# Patient Record
Sex: FEMALE | Race: WHITE | NOT HISPANIC OR LATINO | Employment: FULL TIME | URBAN - METROPOLITAN AREA
[De-identification: names, ages, dates, MRNs, and addresses within clinical notes are randomized per-mention and may not be internally consistent; named-entity substitution may affect disease eponyms.]

---

## 2020-03-12 ENCOUNTER — TELEPHONE (OUTPATIENT)
Dept: FAMILY MEDICINE | Facility: CLINIC | Age: 40
End: 2020-03-12

## 2020-03-12 NOTE — TELEPHONE ENCOUNTER
Spoke with patient.  New to the area.  Called to make an appointment but was sent to triage to review symptoms.    Patient states she thinks she has strep throat.  Symptoms (x 5 days):  Low grade fever, stuffy nose, slight headache.  Denies cough and any international/domestic travel and any exposure to person who tested + for Covid-19    No appointments open at [facility] today.  [facility] because she would like to be seen right now.    Juliana Dewey RN

## 2020-06-16 ENCOUNTER — VIRTUAL VISIT (OUTPATIENT)
Dept: FAMILY MEDICINE | Facility: OTHER | Age: 40
End: 2020-06-16

## 2020-06-16 NOTE — PROGRESS NOTES
"Date: 2020 08:25:59  Clinician: Ji Sandoval  Clinician NPI: [id number]  Patient: Rosalba Mcdonald  Patient : 1980  Patient Address: [address] [place] [address] [address], [place] [address]  Patient Phone: [phone]  Visit Protocol: UTI  Patient Summary:  Rosalba is a 39 year old ( : 1980 ) female who initiated a Visit for a presumed bladder infection. When asked the question \"Please sign me up to receive news, health information and promotions from [employer].\", Rosalba responded \"Yes\".   Her symptoms started 4-6 days ago and consist of feeling as if the bladder is never empty, urinary frequency, urgency, and urinary incontinence.   Symptom details   Urine color: The color of her urine is orange.    Denied symptoms include dysuria, foul-smelling urine, nausea, flank pain, abdominal pain, chills, vaginal discharge, vomiting, and vaginal itching. She does not feel feverish.   Over-the-counter medications or home remedies used to relieve the current symptoms as reported by the patient (free text): AZO   Precipitating events  Rosalba denies having a sexually transmitted disease.  Pertinent medical history  Rosalba has had a bladder infection before and has had 2 in the past 12 months. Her most recent bladder infection was not within the last 4 weeks. Her current symptoms are similar to her previous bladder infection symptoms.   She is not sure what antibiotics have been effective in treating her past bladder infections.   Rosalba typically gets a yeast infection when she takes antibiotics. She has used fluconazole (Diflucan) to treat previous yeast infections. 1 dose of fluconazole (Diflucan) has typically been sufficient for symptoms to resolve in the past.   Rosalba has not been prescribed antibiotics to prevent frequent or repeated bladder infections in the past. She has not experienced problems or side effects with any of the common antibiotics used to treat bladder infections.   Rosalba does not have a history of " kidney stones. She has not used a catheter or been a patient in a hospital or nursing home in the past 2 weeks.   Rosalba does not smoke or use smokeless tobacco.   She denies pregnancy and denies breastfeeding. She does not menstruate.     MEDICATIONS: No current medications, ALLERGIES: NKDA  Clinician Response:  Dear Rosalba,    I have sent you a prescription for bladder infection and yeast infection if you end up needing it.&nbsp; In the event your symptoms do not improve then you should be seen in an urgent care.    Diagnosis: Urinary tract infection, site not specified  Diagnosis ICD: N39.0  Prescription: nitrofurantoin monohyd/m-cryst (Macrobid) 100 mg oral capsule 10 capsule, 5 days supply. Take 1 capsule by mouth every 12 hours for 5 days. Refills: 0, Refill as needed: no, Allow substitutions: yes  Prescription: fluconazole (Diflucan) 150 mg oral tablet 1 tablet, 1 days supply. Take 1 tablet by mouth every 24 hours for 1 day. Refills: 0, Refill as needed: no, Allow substitutions: yes  Pharmacy: [id number] - [phone] - [address], [place] [address]  Addendum created: June 20 12:00:44, 2020 created by: Mira Zavala body: She needs to be seen in clinic for a urinalysis and exam.   Thank you!

## 2020-10-18 ENCOUNTER — VIRTUAL VISIT (OUTPATIENT)
Dept: FAMILY MEDICINE | Facility: OTHER | Age: 40
End: 2020-10-18

## 2020-10-18 NOTE — PROGRESS NOTES
"Date: 10/18/2020 11:40:43  Clinician: Petr Goins  Clinician NPI: [id number]  Patient: Rosalba Mcdonald  Patient : 1980  Patient Address: [address] [place] [address] [address], [place] [address]  Patient Phone: [phone]  Visit Protocol: UTI  Patient Summary:  Rosalba is a 40 year old ( : 1980 ) female who initiated a [facility] Visit for a presumed bladder infection. When asked the question \"Please sign me up to receive news, health information and promotions from [facility].\", Rosalba responded \"No\".   Her symptoms started 1-3 days ago and consist of urinary frequency, urgency, urinary incontinence, dysuria, foul-smelling urine, and feeling as if the bladder is never empty.   Symptom details   Urine color: The color of her urine is yellow.    Denied symptoms include vaginal discharge, flank pain, abdominal pain, chills, vomiting, vaginal itching, and nausea. She does not feel feverish.   Over-the-counter medications or home remedies used to relieve the current symptoms as reported by the patient (free text): Azo   Precipitating events  Rosalba denies having a sexually transmitted disease.  Pertinent medical history  Rosalba has had a bladder infection before and has had 1 in the past 12 months. Her most recent bladder infection was not within the last 4 weeks. Her current symptoms are similar to her previous bladder infection symptoms.   She is not sure what antibiotics have been effective in treating her past bladder infections.   Rosalba typically gets a yeast infection when she takes antibiotics. She has used fluconazole (Diflucan) to treat previous yeast infections. 2 doses of fluconazole (Diflucan) has typically been needed for symptoms to resolve in the past.  Rosalba has not been prescribed antibiotics to prevent frequent or repeated bladder infections in the past. She has not experienced problems or side effects with any of the common antibiotics used to treat bladder infections.   Rosalba does not have a history " of kidney stones. She has not used a catheter or been a patient in a hospital or nursing home in the past 2 weeks.   Rosalba smokes or uses smokeless tobacco.   She denies pregnancy and denies breastfeeding. She does not menstruate.     MEDICATIONS: No current medications, ALLERGIES: NKDA  Clinician Response:  Dear Rosalba,  Based on the information you have provided, you likely have an acute urinary tract infection, also called a bladder infection. Bladder infections occur when bacteria from the outside of the body enters the urinary tract. Any part of the urinary system can be infected, but the bladder is the most common.  Medication information  I am prescribing:     Nitrofurantoin monohyd/m-cryst (Macrobid) 100 mg oral capsule. Take 1 capsule by mouth every 12 hours for 5 days. Take this medication with food. There are no refills with this prescription.   The medication I prescribed for your bladder infection is an antibiotic. Continue taking the medication until it is gone even if you feel better.   Yeast infections can be a common side effect of antibiotics. The most common symptom of a yeast infection is itchiness in and around the vagina. Other signs and symptoms include burning, redness, or a thick, white vaginal discharge that looks like cottage cheese and does not have a bad smell.  Self care  Urination helps to flush bacteria from the urinary tract. For this reason, drinking water and urinating often helps relieve some urinary symptoms and can decrease your risk of getting bladder infections in the future.  Other steps you can take to prevent future bladder infections include:     Wipe front to back after using the bathroom    Urinate after sexual intercourse    Avoid using deodorant sprays, douches, or powders in the vaginal area     Becoming tobacco-free is one of the best things you can do to improve your health. For help with quitting tobacco, you can call 3-334-QUIT-NOW (1-609.184.9704) or visit  smokefree.gov.  When to seek care  Please make an appointment to be seen in a clinic or urgent care if any of the following occur:     You develop new symptoms or your symptoms become worse    You have medication side effects that make it difficult to take them as prescribed    Your symptoms do not improve within 1-2 days of starting treatment    You have symptoms of a bladder infection that return shortly after completing treatment     It is possible to have an allergic reaction to an antibiotic even if you have not had one in the past. If you notice a new rash, significant swelling, or difficulty breathing, stop taking this medication immediately and go to a clinic or urgent care.   Diagnosis: Acute uncomplicated bladder infection  Diagnosis ICD: N39.0  Prescription: nitrofurantoin monohyd/m-cryst (Macrobid) 100 mg oral capsule 10 capsule, 5 days supply. Take 1 capsule by mouth every 12 hours for 5 days. Refills: 0, Refill as needed: no, Allow substitutions: yes  Pharmacy: [facility] #[id number] - [phone] - [address],  [place] [address]

## 2020-10-27 ENCOUNTER — VIRTUAL VISIT (OUTPATIENT)
Dept: FAMILY MEDICINE | Facility: OTHER | Age: 40
End: 2020-10-27

## 2020-10-27 NOTE — PROGRESS NOTES
"Date: 10/27/2020 16:55:45  Clinician: Roxanne Myles  Clinician NPI: [id number]  Patient: Rosalba Mcdonald  Patient : 1980  Patient Address: [address] [place] [address] [address], [place] [address]  Patient Phone: [phone]  Visit Protocol: Yeast infection  Patient Summary:  Rosalba is a 40 year old ( : 1980 ) female who initiated a [facility] Visit for a presumed vaginal yeast infection. When asked the question \"Please sign me up to receive news, health information and promotions from [facility].\", Rosalba responded \"Yes\".    Rosalba began noticing vaginal pruritus, vaginal discharge, and vaginal irritation 1-3 days ago.   Symptom details   Vaginal discharge: She has a more than normal amount of white, chunky (like cottage cheese) discharge. The discharge does not have a fishy smell.    She denies having open sores, vaginal burning, abdominal pain, and blisters. She also denies feeling feverish.   She has attempted to treat her symptoms with anti-fungal cream for yeast infections. Anti-fungal medication used to treat symptoms as reported by the patient (free text): vagisil    4 days helped stop itching but did not treat the issue    Precipitating events  Rosalba denies having a sexually transmitted disease.   Pertinent medical history  Rosalba has a history of vaginal yeast infections. She has had one (1) occurrence in the past year and the current symptoms are similar to previous yeast infections. She has used fluconazole (Diflucan) to treat previous yeast infections. 1 dose of fluconazole (Diflucan) has typically been sufficient for symptoms to resolve in the past.    She prefers a pill. She is currently taking or has taken antibiotics in the past 2 weeks.   She does NOT smoke or use smokeless tobacco.   She denies pregnancy and denies breastfeeding. She does not menstruate.      MEDICATIONS: No current medications, ALLERGIES: NKDA  Clinician Response:  Dear Rosalba,  Based on the information you have provided, you likely " have a vaginal yeast infection which is a common infection of the vagina caused by a fungus. Yeast are a type of fungus.  The most common symptom of a yeast infection is itching in and around the vagina. Other signs and symptoms include burning, redness, or a thick, white vaginal discharge that looks like cottage cheese and does not have a bad smell.  Medication information  I am prescribing:     Fluconazole (Diflucan) 150 mg oral tablet. Take 1 tablet by mouth in a single dose. There are no refills with this prescription.   Self care  Steps you can take to prevent symptoms of future vaginal yeast infection:     Avoid irritants such as scented bath products, tampons, pads, or vaginal sprays and powders    Avoid douching    Wear cotton underwear and if you are comfortable doing so, do not wear underwear to bed    Avoid hot tubs and whirlpool spas     When to seek care  Most women notice improvement in their symptoms within 1-2 days after starting treatment with complete clearing in 5-7 days.  Please make an appointment to be seen in a clinic or urgent care if any of the following occur:     Your symptoms have not improved in 3 days or not resolved in 10 days    You develop new symptoms or your symptoms become worse    If you think you may be at risk for an STD      Diagnosis: Candida vulvovaginitis  Diagnosis ICD: B37.3  Prescription: fluconazole (Diflucan) 150 mg oral tablet 1 tablet, 1 days supply. Take 1 tablet by mouth in a single dose. Refills: 0, Refill as needed: no, Allow substitutions: yes  Pharmacy: [facility] #[id number] - [phone] - [address],  [place] [address]

## 2020-11-04 ENCOUNTER — VIRTUAL VISIT (OUTPATIENT)
Dept: FAMILY MEDICINE | Facility: OTHER | Age: 40
End: 2020-11-04
Payer: COMMERCIAL

## 2020-11-04 PROCEDURE — 99421 OL DIG E/M SVC 5-10 MIN: CPT | Performed by: NURSE PRACTITIONER

## 2020-11-04 NOTE — PROGRESS NOTES
"Date: 2020 09:38:08  Clinician: Jessica Bello  Clinician NPI: [id number]  Patient: Rosalba Mcdonald  Patient : 1980  Patient Address: [address] [place] [address] [address], [place] [address]  Patient Phone: [phone]  Visit Protocol: General skin conditions  Patient Summary:  Rosalba is a 40 year old ( : 1980 ) female who initiated a [facility] Visit for evaluation of an unspecified skin condition. When asked the question \"Please sign me up to receive news, health information and promotions from [facility].\", Rosalba responded \"No\".    Images of her skin condition were uploaded.  Her symptoms started 1-2 weeks ago and affect the left side of her body. The skin condition is located on her chest. The skin condition is red and yellow in color.   The affected area has scabs and sores. It feels itchy and burning. The symptoms do not interfere with her sleep.   Symptom details   Redness: The redness has not rapidly increased in size.   The skin condition has not changed since the symptoms started.   Denied symptoms include blisters, drainage, dry/flaky skin, hives, crusts, tender to touch, pain, warm to touch, pimples, and numbness. Rosalba does not feel feverish. She does not have a rash in the shape of a bull's-eye.   Treatments or home remedies used to relieve the symptoms as reported by the patient (free text): not wearing a bra- seems to get worse when my breasts are close together causing sweat, when it is dry seems fine- also been using Lotramine to help and it does but never heals completely.   Precipitating events  Just before the symptoms started, Rosalba came in contact with new medications.    Rosalba has not been in close contact with anyone that has similar symptoms. She also did not spend time in a wooded area, swim, travel, or spend excess time in the sun just before her symptoms started. Rosalba did not get bitten or stung by an insect.   Pertinent medical history  Rosalba has experienced this skin condition " before. Her current skin condition comes and goes. The last time she experienced this skin condition was 4-6 months ago.   Rosalba has had chickenpox, but has not had shingles in the past.    Rosalba does not have a history of atopia. Ongoing medical conditions were denied.   Rosalba does not need a return to work/school note.   Weight: 240 lbs   Rosalba smokes or uses smokeless tobacco.   She denies pregnancy and denies breastfeeding. She does not menstruate.   Weight: 240 lbs    MEDICATIONS: No current medications, ALLERGIES: NKDA  Clinician Response:  Dear Rosalba,    This does not look like a typical yeast rash, but it is in a typical location for yeast. Please try this prescription. Discontinue the Lotrimin cream.&nbsp; If this does not improve symptoms you should be seen for an in person evaluation.    Diagnosis: Candidiasis of skin and nail  Diagnosis ICD: B37.2  Prescription: nystatin (Nyamyc) 100,000 unit/gram topical powder 1 15 gram squeeze bottle, 7 days supply. Apply 1 powder to the affected area(s) topically 2 times per day for 7 days. Refills: 0, Refill as needed: no, Allow substitutions: yes  Pharmacy: [facility] #[id number] - [phone] - [address],  [place] [address]

## 2021-09-27 ENCOUNTER — HOSPITAL ENCOUNTER (EMERGENCY)
Facility: CLINIC | Age: 41
Discharge: HOME | End: 2021-09-27
Attending: EMERGENCY MEDICINE | Admitting: EMERGENCY MEDICINE
Payer: COMMERCIAL

## 2021-09-27 ENCOUNTER — APPOINTMENT (OUTPATIENT)
Dept: RADIOLOGY | Facility: CLINIC | Age: 41
End: 2021-09-27
Attending: EMERGENCY MEDICINE
Payer: COMMERCIAL

## 2021-09-27 VITALS
WEIGHT: 240 LBS | SYSTOLIC BLOOD PRESSURE: 135 MMHG | RESPIRATION RATE: 26 BRPM | HEIGHT: 71 IN | HEART RATE: 78 BPM | OXYGEN SATURATION: 96 % | BODY MASS INDEX: 33.6 KG/M2 | DIASTOLIC BLOOD PRESSURE: 68 MMHG | TEMPERATURE: 97.7 F

## 2021-09-27 DIAGNOSIS — U07.1 2019 NOVEL CORONAVIRUS DISEASE (COVID-19): ICD-10-CM

## 2021-09-27 LAB
ALBUMIN SERPL-MCNC: 3.6 G/DL (ref 3.5–5)
ALBUMIN UR-MCNC: NEGATIVE MG/DL
ALP SERPL-CCNC: 110 U/L (ref 45–120)
ALT SERPL W P-5'-P-CCNC: 26 U/L (ref 0–45)
ANION GAP SERPL CALCULATED.3IONS-SCNC: 12 MMOL/L (ref 5–18)
APPEARANCE UR: CLEAR
AST SERPL W P-5'-P-CCNC: 24 U/L (ref 0–40)
BASOPHILS # BLD AUTO: 0 10E3/UL (ref 0–0.2)
BASOPHILS NFR BLD AUTO: 0 %
BILIRUB SERPL-MCNC: 0.1 MG/DL (ref 0–1)
BILIRUB UR QL STRIP: NEGATIVE
BUN SERPL-MCNC: 10 MG/DL (ref 8–22)
C REACTIVE PROTEIN LHE: 0.4 MG/DL (ref 0–0.8)
CALCIUM SERPL-MCNC: 8.8 MG/DL (ref 8.5–10.5)
CHLORIDE BLD-SCNC: 110 MMOL/L (ref 98–107)
CO2 SERPL-SCNC: 16 MMOL/L (ref 22–31)
COLOR UR AUTO: COLORLESS
CREAT SERPL-MCNC: 0.76 MG/DL (ref 0.6–1.1)
D DIMER PPP FEU-MCNC: 0.47 UG/ML FEU (ref 0–0.5)
EOSINOPHIL # BLD AUTO: 0.2 10E3/UL (ref 0–0.7)
EOSINOPHIL NFR BLD AUTO: 3 %
ERYTHROCYTE [DISTWIDTH] IN BLOOD BY AUTOMATED COUNT: 12.2 % (ref 10–15)
FERRITIN SERPL-MCNC: 218 NG/ML (ref 10–130)
GFR SERPL CREATININE-BSD FRML MDRD: >90 ML/MIN/1.73M2
GLUCOSE BLD-MCNC: 96 MG/DL (ref 70–125)
GLUCOSE UR STRIP-MCNC: NEGATIVE MG/DL
HCT VFR BLD AUTO: 42.9 % (ref 35–47)
HGB BLD-MCNC: 14.8 G/DL (ref 11.7–15.7)
HGB UR QL STRIP: NEGATIVE
IMM GRANULOCYTES # BLD: 0 10E3/UL
IMM GRANULOCYTES NFR BLD: 0 %
KETONES UR STRIP-MCNC: NEGATIVE MG/DL
LACTATE SERPL-SCNC: 0.9 MMOL/L (ref 0.7–2)
LDH SERPL L TO P-CCNC: 251 U/L (ref 125–220)
LEUKOCYTE ESTERASE UR QL STRIP: NEGATIVE
LIPASE SERPL-CCNC: 36 U/L (ref 0–52)
LYMPHOCYTES # BLD AUTO: 4.2 10E3/UL (ref 0.8–5.3)
LYMPHOCYTES NFR BLD AUTO: 59 %
MCH RBC QN AUTO: 32 PG (ref 26.5–33)
MCHC RBC AUTO-ENTMCNC: 34.5 G/DL (ref 31.5–36.5)
MCV RBC AUTO: 93 FL (ref 78–100)
MONOCYTES # BLD AUTO: 0.5 10E3/UL (ref 0–1.3)
MONOCYTES NFR BLD AUTO: 7 %
MUCOUS THREADS #/AREA URNS LPF: PRESENT /LPF
NEUTROPHILS # BLD AUTO: 2.2 10E3/UL (ref 1.6–8.3)
NEUTROPHILS NFR BLD AUTO: 31 %
NITRATE UR QL: NEGATIVE
NRBC # BLD AUTO: 0 10E3/UL
NRBC BLD AUTO-RTO: 0 /100
PH UR STRIP: 5.5 [PH] (ref 5–7)
PLATELET # BLD AUTO: 236 10E3/UL (ref 150–450)
POTASSIUM BLD-SCNC: 3.9 MMOL/L (ref 3.5–5)
PROT SERPL-MCNC: 6.8 G/DL (ref 6–8)
RBC # BLD AUTO: 4.62 10E6/UL (ref 3.8–5.2)
RBC URINE: <1 /HPF
SODIUM SERPL-SCNC: 138 MMOL/L (ref 136–145)
SP GR UR STRIP: 1.01 (ref 1–1.03)
SQUAMOUS EPITHELIAL: 1 /HPF
TROPONIN I SERPL-MCNC: <0.01 NG/ML (ref 0–0.29)
UROBILINOGEN UR STRIP-MCNC: <2 MG/DL
WBC # BLD AUTO: 7.1 10E3/UL (ref 4–11)
WBC URINE: <1 /HPF

## 2021-09-27 PROCEDURE — 36415 COLL VENOUS BLD VENIPUNCTURE: CPT | Performed by: EMERGENCY MEDICINE

## 2021-09-27 PROCEDURE — 81001 URINALYSIS AUTO W/SCOPE: CPT | Performed by: EMERGENCY MEDICINE

## 2021-09-27 PROCEDURE — 83690 ASSAY OF LIPASE: CPT | Performed by: EMERGENCY MEDICINE

## 2021-09-27 PROCEDURE — 93005 ELECTROCARDIOGRAM TRACING: CPT | Performed by: EMERGENCY MEDICINE

## 2021-09-27 PROCEDURE — 93005 ELECTROCARDIOGRAM TRACING: CPT

## 2021-09-27 PROCEDURE — 83605 ASSAY OF LACTIC ACID: CPT | Performed by: EMERGENCY MEDICINE

## 2021-09-27 PROCEDURE — 71045 X-RAY EXAM CHEST 1 VIEW: CPT

## 2021-09-27 PROCEDURE — 99285 EMERGENCY DEPT VISIT HI MDM: CPT | Mod: 25

## 2021-09-27 PROCEDURE — 250N000013 HC RX MED GY IP 250 OP 250 PS 637: Performed by: EMERGENCY MEDICINE

## 2021-09-27 PROCEDURE — 82728 ASSAY OF FERRITIN: CPT | Performed by: EMERGENCY MEDICINE

## 2021-09-27 PROCEDURE — 83615 LACTATE (LD) (LDH) ENZYME: CPT | Performed by: EMERGENCY MEDICINE

## 2021-09-27 PROCEDURE — 85025 COMPLETE CBC W/AUTO DIFF WBC: CPT | Performed by: EMERGENCY MEDICINE

## 2021-09-27 PROCEDURE — 86141 C-REACTIVE PROTEIN HS: CPT | Performed by: EMERGENCY MEDICINE

## 2021-09-27 PROCEDURE — 84484 ASSAY OF TROPONIN QUANT: CPT | Performed by: EMERGENCY MEDICINE

## 2021-09-27 PROCEDURE — 80053 COMPREHEN METABOLIC PANEL: CPT | Performed by: EMERGENCY MEDICINE

## 2021-09-27 PROCEDURE — 85379 FIBRIN DEGRADATION QUANT: CPT | Performed by: EMERGENCY MEDICINE

## 2021-09-27 RX ORDER — ACETAMINOPHEN 325 MG/1
975 TABLET ORAL ONCE
Status: COMPLETED | OUTPATIENT
Start: 2021-09-27 | End: 2021-09-27

## 2021-09-27 RX ADMIN — ACETAMINOPHEN 975 MG: 325 TABLET ORAL at 03:36

## 2021-09-27 ASSESSMENT — MIFFLIN-ST. JEOR: SCORE: 1859.76

## 2021-09-27 NOTE — DISCHARGE INSTRUCTIONS
Continue to quarantine for the next week  Tylenol 650 mg every 4 hours as needed for pain or fever  IbuProfen 600 mg every 6 hours as needed for pain or fever  Return to the emergency department for worsening shortness of breath or chest pain

## 2021-09-27 NOTE — Clinical Note
Rosalba Mcdonald was seen and treated in our emergency department on 9/27/2021.  She may return to work on 10/04/2021.       If you have any questions or concerns, please don't hesitate to call.      Kendy Guallpa MD

## 2021-09-27 NOTE — ED PROVIDER NOTES
EMERGENCY DEPARTMENT ENCOUnter      NAME: Rosalba Mcdonald  AGE: 41 year old female  YOB: 1980  MRN: [id number]  EVALUATION DATE & TIME: 9/27/2021  2:23 AM    PCP: No Ref-Primary, Physician    ED PROVIDER: Kendy Guallpa MD      Chief Complaint   Patient presents with     Chest Pain         FINAL IMPRESSION:  1. 2019 novel coronavirus disease (COVID-19)          ED COURSE & MEDICAL DECISION MAKING:      In summary, the patient is a 41-year-old female that presents to the emergency department for evaluation of pain thought secondary to COVID-19 infection.  She has no evidence of ACS or thromboembolism.  We will treat symptomatically as an outpatient.  2:30 AM Met with patient for initial interview and exam. Plan for care in the ED was discussed. I saw the patient wearing an eye shield, N95 and surgical mask, gown, and gloves.   Tylenol 975 mg p.o. was administered.  5:03 AM Updated the patient. We discussed plans for discharge including supportive cares, symptomatic treatment, outpatient follow up, and reasons to return to the emergency department.    At the conclusion of the encounter I discussed the results of all of the tests and the disposition. The questions were answered. The patient or family acknowledged understanding and was agreeable with the care plan.         MEDICATIONS GIVEN IN THE EMERGENCY:  Medications   acetaminophen (TYLENOL) tablet 975 mg (975 mg Oral Given 9/27/21 0336)       NEW PRESCRIPTIONS STARTED AT TODAY'S ER VISIT  New Prescriptions    No medications on file          =================================================================    HPI        Rosalba Mcdonald is a 41 year old female with a pertinent history of anxiety and former tobacco abuse who presents to this ED via walk in for evaluation of chest pain.     Per chart review, prior to arrival patient called the care line and stated that she tested positive for Covid-19 on 9/24/21 and has been having symptoms since  9/21/21. Patient with complaints of constant upper chest pain and back pain that has been worsening since her diagnosis.     Patient tested positive for Covid-19 on 9/24/21 and presents primary because of chest pain and back pain. Pain is rated a 4/10 in intensity and patient states she is unable to get comfortable at home because of the pain. She has been taking tylenol without relief. Additionally, patient endorses a non productive cough, mild shortness of breath with exertion, nausea, and bad intermittent heart burn over the past few days. Denies diarrhea. Patient is fully vaccinated and states she is unsure how she got Covid.     Patient is a former smoker and drinker. She works for public [employer].     REVIEW OF SYSTEMS     Constitutional: Endorses intermittent heart burn. Denies fever or chills  HENT:  Denies sore throat   Respiratory: Endorses non productive cough and mild shortness of breath with exertion.  Cardiovascular: Endorses chest pain. Denies palpitations  GI: Endorses nausea. Denies abdominal pain, diarrhea, or vomiting  Musculoskeletal: Endorses back pain. Denies any new extremity pain   Skin:  Denies rash   Neurologic:  Denies headache, focal weakness or sensory changes    All other systems reviewed and are negative      PAST MEDICAL HISTORY:  No past medical history on file.    PAST SURGICAL HISTORY:  No past surgical history on file.        CURRENT MEDICATIONS:    cyclobenzaprine (FLEXERIL) 10 MG tablet        ALLERGIES:  No Known Allergies    FAMILY HISTORY:  No family history on file.    SOCIAL HISTORY:   Social History     Socioeconomic History     Marital status: Single     Spouse name: Not on file     Number of children: Not on file     Years of education: Not on file     Highest education level: Not on file   Occupational History     Not on file   Tobacco Use     Smoking status: Current Every Day Smoker     Tobacco comment: 5 cigarettes per day   Substance and Sexual Activity     Alcohol  "use: Not on file     Drug use: Not on file     Sexual activity: Not on file   Other Topics Concern     Not on file   Social History Narrative     Not on file     Social Determinants of Health     Financial Resource Strain:      Difficulty of Paying Living Expenses:    Food Insecurity:      Worried About Running Out of Food in the Last Year:      Ran Out of Food in the Last Year:    Transportation Needs:      Lack of Transportation (Medical):      Lack of Transportation (Non-Medical):    Physical Activity:      Days of Exercise per Week:      Minutes of Exercise per Session:    Stress:      Feeling of Stress :    Social Connections:      Frequency of Communication with Friends and Family:      Frequency of Social Gatherings with Friends and Family:      Attends [employer] Services:      Active Member of Clubs or Organizations:      Attends Club or Organization Meetings:      Marital Status:    Intimate Partner Violence:      Fear of Current or Ex-Partner:      Emotionally Abused:      Physically Abused:      Sexually Abused:        VITALS:  Patient Vitals for the past 24 hrs:   BP Temp Temp src Pulse Resp SpO2 Height Weight   09/27/21 0345 -- -- -- 65 12 96 % -- --   09/27/21 0330 -- -- -- 78 20 95 % -- --   09/27/21 0315 -- -- -- 68 20 97 % -- --   09/27/21 0300 -- -- -- 79 20 97 % -- --   09/27/21 0226 135/66 97.7  F (36.5  C) Oral 90 18 96 % 1.803 m (5' 11\") 108.9 kg (240 lb)       PHYSICAL EXAM    Constitutional:  Well developed, Well nourished,  HENT:  Normocephalic, Atraumatic, Bilateral external ears normal, Oropharynx moist, Nose normal.   Neck:  Normal range of motion, No meningismus, No stridor.   Eyes:  EOMI, Conjunctiva normal, No discharge.   Respiratory:  Normal breath sounds, No respiratory distress, No wheezing, No chest tenderness.   Cardiovascular:  Normal heart rate, Normal rhythm, No murmurs  GI:  Soft, No tenderness, No guarding, No CVA tenderness.   Musculoskeletal:  Neurovascularly intact " distally, No edema, No tenderness, No cyanosis, Good range of motion in all major joints. No tenderness to palpation or major deformities noted.   Integument:  Warm, Dry, No erythema, No rash.   Lymphatic:  No lymphadenopathy noted.   Neurologic:  Alert & oriented x 3, Normal motor function, Normal sensory function, No focal deficits noted.   Psychiatric:  Affect normal, Judgment normal, Mood normal.      LAB:  All pertinent labs reviewed and interpreted.  Results for orders placed or performed during the hospital encounter of 09/27/21   XR Chest Port 1 View    Impression    IMPRESSION: Lungs appear clear. No pleural fluid or pneumothorax. Normal heart size and pulmonary vascularity.   D dimer quantitative   Result Value Ref Range    D-Dimer Quantitative 0.47 0.00 - 0.50 ug/mL FEU   Comprehensive metabolic panel   Result Value Ref Range    Sodium 138 136 - 145 mmol/L    Potassium 3.9 3.5 - 5.0 mmol/L    Chloride 110 (H) 98 - 107 mmol/L    Carbon Dioxide (CO2) 16 (L) 22 - 31 mmol/L    Anion Gap 12 5 - 18 mmol/L    Urea Nitrogen 10 8 - 22 mg/dL    Creatinine 0.76 0.60 - 1.10 mg/dL    Calcium 8.8 8.5 - 10.5 mg/dL    Glucose 96 70 - 125 mg/dL    Alkaline Phosphatase 110 45 - 120 U/L    AST 24 0 - 40 U/L    ALT 26 0 - 45 U/L    Protein Total 6.8 6.0 - 8.0 g/dL    Albumin 3.6 3.5 - 5.0 g/dL    Bilirubin Total 0.1 0.0 - 1.0 mg/dL    GFR Estimate >90 >60 mL/min/1.73m2   Result Value Ref Range    Lipase 36 0 - 52 U/L   Lactic acid whole blood   Result Value Ref Range    Lactic Acid 0.9 0.7 - 2.0 mmol/L   Result Value Ref Range    Troponin I <0.01 0.00 - 0.29 ng/mL   CRP inflammation   Result Value Ref Range    CRP 0.4 0.0-<0.8 mg/dL   Result Value Ref Range    Lactate Dehydrogenase 251 (H) 125 - 220 U/L   CBC with platelets and differential   Result Value Ref Range    WBC Count 7.1 4.0 - 11.0 10e3/uL    RBC Count 4.62 3.80 - 5.20 10e6/uL    Hemoglobin 14.8 11.7 - 15.7 g/dL    Hematocrit 42.9 35.0 - 47.0 %    MCV 93 78 - 100  fL    MCH 32.0 26.5 - 33.0 pg    MCHC 34.5 31.5 - 36.5 g/dL    RDW 12.2 10.0 - 15.0 %    Platelet Count 236 150 - 450 10e3/uL    % Neutrophils 31 %    % Lymphocytes 59 %    % Monocytes 7 %    % Eosinophils 3 %    % Basophils 0 %    % Immature Granulocytes 0 %    NRBCs per 100 WBC 0 <1 /100    Absolute Neutrophils 2.2 1.6 - 8.3 10e3/uL    Absolute Lymphocytes 4.2 0.8 - 5.3 10e3/uL    Absolute Monocytes 0.5 0.0 - 1.3 10e3/uL    Absolute Eosinophils 0.2 0.0 - 0.7 10e3/uL    Absolute Basophils 0.0 0.0 - 0.2 10e3/uL    Absolute Immature Granulocytes 0.0 <=0.0 10e3/uL    Absolute NRBCs 0.0 10e3/uL       RADIOLOGY:  I have independently reviewed and interpreted the above imaging, pending the final radiology read.  XR Chest Port 1 View   Final Result   IMPRESSION: Lungs appear clear. No pleural fluid or pneumothorax. Normal heart size and pulmonary vascularity.          EK-rate is 72, sinus, there is no ST segment elevation or depression appreciated.    I have independently reviewed and interpreted this EKG          I, France Mcdonald, am serving as a scribe to document services personally performed by Dr. Guallpa based on my observation and the provider's statements to me. I, Kendy Guallpa MD attest that France Mcdonald is acting in a scribe capacity, has observed my performance of the services and has documented them in accordance with my direction.    Kendy Guallpa MD  Emergency Medicine  [facility] EMERGENCY ROOM  [address] [facility] [id number] [phone]  Dept: [phone]       Kendy Guallpa MD  21 0117

## 2021-09-27 NOTE — ED TRIAGE NOTES
Pt states that she has had pain between her shoulder blades x a few days but getting worse and its constant pain that increases with a deep breath. Pt also has chest pain.  Pt took theraflu around 0000

## 2021-10-08 LAB
ATRIAL RATE - MUSE: 72 BPM
DIASTOLIC BLOOD PRESSURE - MUSE: 66 MMHG
INTERPRETATION ECG - MUSE: NORMAL
P AXIS - MUSE: 56 DEGREES
PR INTERVAL - MUSE: 172 MS
QRS DURATION - MUSE: 80 MS
QT - MUSE: 370 MS
QTC - MUSE: 405 MS
R AXIS - MUSE: 72 DEGREES
SYSTOLIC BLOOD PRESSURE - MUSE: 135 MMHG
T AXIS - MUSE: 62 DEGREES
VENTRICULAR RATE- MUSE: 72 BPM

## 2022-05-23 ENCOUNTER — HOSPITAL ENCOUNTER (EMERGENCY)
Facility: CLINIC | Age: 42
Discharge: HOME | End: 2022-05-23
Attending: EMERGENCY MEDICINE | Admitting: EMERGENCY MEDICINE
Payer: COMMERCIAL

## 2022-05-23 VITALS
BODY MASS INDEX: 35 KG/M2 | OXYGEN SATURATION: 98 % | RESPIRATION RATE: 18 BRPM | DIASTOLIC BLOOD PRESSURE: 85 MMHG | HEIGHT: 71 IN | HEART RATE: 97 BPM | WEIGHT: 250 LBS | SYSTOLIC BLOOD PRESSURE: 127 MMHG | TEMPERATURE: 99.5 F

## 2022-05-23 DIAGNOSIS — J02.0 ACUTE STREPTOCOCCAL PHARYNGITIS: ICD-10-CM

## 2022-05-23 LAB
DEPRECATED S PYO AG THROAT QL EIA: POSITIVE
FLUAV RNA SPEC QL NAA+PROBE: NEGATIVE
FLUBV RNA RESP QL NAA+PROBE: NEGATIVE
RSV RNA SPEC NAA+PROBE: NEGATIVE
SARS-COV-2 RNA RESP QL NAA+PROBE: NEGATIVE

## 2022-05-23 PROCEDURE — 87637 SARSCOV2&INF A&B&RSV AMP PRB: CPT | Performed by: EMERGENCY MEDICINE

## 2022-05-23 PROCEDURE — 87880 STREP A ASSAY W/OPTIC: CPT | Performed by: EMERGENCY MEDICINE

## 2022-05-23 PROCEDURE — 99283 EMERGENCY DEPT VISIT LOW MDM: CPT | Mod: CS

## 2022-05-23 PROCEDURE — C9803 HOPD COVID-19 SPEC COLLECT: HCPCS

## 2022-05-23 RX ORDER — CLINDAMYCIN HCL 300 MG
300 CAPSULE ORAL 3 TIMES DAILY
Qty: 30 CAPSULE | Refills: 0 | Status: SHIPPED | OUTPATIENT
Start: 2022-05-23 | End: 2022-06-02

## 2022-05-23 ASSESSMENT — ENCOUNTER SYMPTOMS
SHORTNESS OF BREATH: 0
SORE THROAT: 1
CHILLS: 1
FEVER: 1
ABDOMINAL PAIN: 0
COUGH: 1

## 2022-05-23 NOTE — Clinical Note
Rosalba Mcdonald was seen and treated in our emergency department on 5/23/2022.  She may return to work on 05/24/2022.       If you have any questions or concerns, please don't hesitate to call.      Adrian Garcia MD

## 2022-05-23 NOTE — ED PROVIDER NOTES
EMERGENCY DEPARTMENT ENCOUNTER      NAME: Rosalba Mcdonald  AGE: 41 year old female  YOB: 1980  MRN: [id number]  EVALUATION DATE & TIME: 5/23/2022  5:33 AM    PCP: No Ref-Primary, Physician    ED PROVIDER: Adrian Garcia M.D.      Chief Complaint   Patient presents with     Pharyngitis         FINAL IMPRESSION:  1. Acute streptococcal pharyngitis          ED COURSE & MEDICAL DECISION MAKING:    Pertinent Labs & Imaging studies reviewed. (See chart for details)  41 year old female presents to the Emergency Department for evaluation of sore throat.  Strep is positive.  COVID influenza are pending.  We will treat her with clindamycin for strep.  No signs of abscess.  Discussed plan of care.  Patient discharged home    5:46 AM I met with the patient to gather history and to perform my initial exam. I discussed the plan for care while in the Emergency Department. PPE:N95 Facemask, goggles and gloves      At the conclusion of the encounter I discussed the results of all of the tests and the disposition. The questions were answered. The patient or family acknowledged understanding and was agreeable with the care plan.           MEDICATIONS GIVEN IN THE EMERGENCY:  Medications - No data to display    NEW PRESCRIPTIONS STARTED AT TODAY'S ER VISIT  New Prescriptions    CLINDAMYCIN (CLEOCIN) 300 MG CAPSULE    Take 1 capsule (300 mg) by mouth 3 times daily for 10 days          =================================================================    HPI      Rosalba Mcdonald is a 41 year old female with a pertinent history of recurrent pharyngitis who presents to this ED  for evaluation of sore throat.  This started yesterday.  Has a history of recurrent pharyngitis.  Chills last night.  Has had a slight cough.  Some postnasal drip.  No allergies.  Not taking any medications.  She has had pharyngitis a couple times the last few years.  No nausea or vomiting.  No abdominal pain.  She has had her COVID and flu  "vaccines      REVIEW OF SYSTEMS   Review of Systems   Constitutional: Positive for chills and fever.   HENT: Positive for postnasal drip and sore throat.    Respiratory: Positive for cough. Negative for shortness of breath.    Cardiovascular: Negative for chest pain.   Gastrointestinal: Negative for abdominal pain.   All other systems reviewed and are negative.       PAST MEDICAL HISTORY:  No past medical history on file.    PAST SURGICAL HISTORY:  No past surgical history on file.        CURRENT MEDICATIONS:    No current facility-administered medications for this encounter.     Current Outpatient Medications   Medication     clindamycin (CLEOCIN) 300 MG capsule     cyclobenzaprine (FLEXERIL) 10 MG tablet         ALLERGIES:  Allergies   Allergen Reactions     Penicillins Other (See Comments)     Yeast infections       FAMILY HISTORY:  No family history on file.    SOCIAL HISTORY:   Social History     Socioeconomic History     Marital status: Single   Tobacco Use     Smoking status: Current Every Day Smoker     Tobacco comment: 5 cigarettes per day       VITALS:  /85   Pulse 97   Temp 99.5  F (37.5  C) (Oral)   Resp 18   Ht 1.803 m (5' 11\")   Wt 113.4 kg (250 lb)   SpO2 98%   BMI 34.87 kg/m      PHYSICAL EXAM    Physical Exam  Constitutional:       General: She is not in acute distress.     Appearance: She is not diaphoretic.   HENT:      Head: Atraumatic.      Mouth/Throat:      Pharynx: Uvula midline. Oropharyngeal exudate and posterior oropharyngeal erythema present.   Eyes:      General: No scleral icterus.     Pupils: Pupils are equal, round, and reactive to light.   Cardiovascular:      Heart sounds: Normal heart sounds.   Pulmonary:      Effort: No respiratory distress.      Breath sounds: Normal breath sounds.   Abdominal:      Palpations: Abdomen is soft.      Tenderness: There is no abdominal tenderness. There is no guarding or rebound.   Musculoskeletal:         General: No tenderness. "   Skin:     General: Skin is warm.      Findings: No rash.   Neurological:      General: No focal deficit present.      Mental Status: She is alert.           LAB:  All pertinent labs reviewed and interpreted.  Labs Ordered and Resulted from Time of ED Arrival to Time of ED Departure   STREPTOCOCCUS A RAPID SCREEN W REFELX TO PCR - Abnormal       Result Value    Group A Strep antigen Positive (*)    INFLUENZA A/B & SARS-COV2 PCR MULTIPLEX       RADIOLOGY:  Reviewed all pertinent imaging. Please see official radiology report.  No orders to display         Adrian Garcia M.D.  Emergency Medicine  [facility] EMERGENCY ROOM  [address] [place] [id number]  [phone]  Dept: [phone]     Adrian Garcia MD  05/23/22 0609

## 2023-03-03 ENCOUNTER — APPOINTMENT (OUTPATIENT)
Dept: RADIOLOGY | Facility: CLINIC | Age: 43
End: 2023-03-03
Attending: EMERGENCY MEDICINE
Payer: COMMERCIAL

## 2023-03-03 ENCOUNTER — HOSPITAL ENCOUNTER (EMERGENCY)
Facility: CLINIC | Age: 43
Discharge: HOME | End: 2023-03-03
Attending: EMERGENCY MEDICINE | Admitting: EMERGENCY MEDICINE
Payer: COMMERCIAL

## 2023-03-03 VITALS
DIASTOLIC BLOOD PRESSURE: 83 MMHG | WEIGHT: 253 LBS | TEMPERATURE: 98.2 F | SYSTOLIC BLOOD PRESSURE: 126 MMHG | BODY MASS INDEX: 35.29 KG/M2 | OXYGEN SATURATION: 98 % | RESPIRATION RATE: 20 BRPM | HEART RATE: 80 BPM

## 2023-03-03 DIAGNOSIS — K21.00 GASTROESOPHAGEAL REFLUX DISEASE WITH ESOPHAGITIS WITHOUT HEMORRHAGE: ICD-10-CM

## 2023-03-03 DIAGNOSIS — K29.00 ACUTE GASTRITIS WITHOUT HEMORRHAGE, UNSPECIFIED GASTRITIS TYPE: ICD-10-CM

## 2023-03-03 LAB
ALBUMIN SERPL-MCNC: 4 G/DL (ref 3.5–5)
ALP SERPL-CCNC: 119 U/L (ref 45–120)
ALT SERPL W P-5'-P-CCNC: 22 U/L (ref 0–45)
ANION GAP SERPL CALCULATED.3IONS-SCNC: 9 MMOL/L (ref 5–18)
AST SERPL W P-5'-P-CCNC: 19 U/L (ref 0–40)
ATRIAL RATE - MUSE: 90 BPM
BASOPHILS # BLD AUTO: 0.1 10E3/UL (ref 0–0.2)
BASOPHILS NFR BLD AUTO: 1 %
BILIRUB SERPL-MCNC: 0.5 MG/DL (ref 0–1)
BUN SERPL-MCNC: 11 MG/DL (ref 8–22)
CALCIUM SERPL-MCNC: 9.6 MG/DL (ref 8.5–10.5)
CHLORIDE BLD-SCNC: 105 MMOL/L (ref 98–107)
CO2 SERPL-SCNC: 27 MMOL/L (ref 22–31)
CREAT SERPL-MCNC: 0.8 MG/DL (ref 0.6–1.1)
DIASTOLIC BLOOD PRESSURE - MUSE: NORMAL MMHG
EOSINOPHIL # BLD AUTO: 0.7 10E3/UL (ref 0–0.7)
EOSINOPHIL NFR BLD AUTO: 7 %
ERYTHROCYTE [DISTWIDTH] IN BLOOD BY AUTOMATED COUNT: 12.6 % (ref 10–15)
GFR SERPL CREATININE-BSD FRML MDRD: >90 ML/MIN/1.73M2
GLUCOSE BLD-MCNC: 101 MG/DL (ref 70–125)
HCT VFR BLD AUTO: 39.2 % (ref 35–47)
HGB BLD-MCNC: 13.7 G/DL (ref 11.7–15.7)
IMM GRANULOCYTES # BLD: 0 10E3/UL
IMM GRANULOCYTES NFR BLD: 0 %
INTERPRETATION ECG - MUSE: NORMAL
LIPASE SERPL-CCNC: 28 U/L (ref 0–52)
LYMPHOCYTES # BLD AUTO: 4 10E3/UL (ref 0.8–5.3)
LYMPHOCYTES NFR BLD AUTO: 38 %
MCH RBC QN AUTO: 32.1 PG (ref 26.5–33)
MCHC RBC AUTO-ENTMCNC: 34.9 G/DL (ref 31.5–36.5)
MCV RBC AUTO: 92 FL (ref 78–100)
MONOCYTES # BLD AUTO: 1 10E3/UL (ref 0–1.3)
MONOCYTES NFR BLD AUTO: 10 %
NEUTROPHILS # BLD AUTO: 4.7 10E3/UL (ref 1.6–8.3)
NEUTROPHILS NFR BLD AUTO: 44 %
NRBC # BLD AUTO: 0 10E3/UL
NRBC BLD AUTO-RTO: 0 /100
P AXIS - MUSE: 72 DEGREES
PLATELET # BLD AUTO: 281 10E3/UL (ref 150–450)
POTASSIUM BLD-SCNC: 3.6 MMOL/L (ref 3.5–5)
PR INTERVAL - MUSE: 174 MS
PROT SERPL-MCNC: 7 G/DL (ref 6–8)
QRS DURATION - MUSE: 74 MS
QT - MUSE: 348 MS
QTC - MUSE: 425 MS
R AXIS - MUSE: 64 DEGREES
RBC # BLD AUTO: 4.27 10E6/UL (ref 3.8–5.2)
SODIUM SERPL-SCNC: 141 MMOL/L (ref 136–145)
SYSTOLIC BLOOD PRESSURE - MUSE: NORMAL MMHG
T AXIS - MUSE: 42 DEGREES
TROPONIN I SERPL-MCNC: 0.01 NG/ML (ref 0–0.29)
VENTRICULAR RATE- MUSE: 90 BPM
WBC # BLD AUTO: 10.5 10E3/UL (ref 4–11)

## 2023-03-03 PROCEDURE — 96375 TX/PRO/DX INJ NEW DRUG ADDON: CPT

## 2023-03-03 PROCEDURE — C9113 INJ PANTOPRAZOLE SODIUM, VIA: HCPCS | Performed by: EMERGENCY MEDICINE

## 2023-03-03 PROCEDURE — 80053 COMPREHEN METABOLIC PANEL: CPT | Performed by: EMERGENCY MEDICINE

## 2023-03-03 PROCEDURE — 84484 ASSAY OF TROPONIN QUANT: CPT | Performed by: EMERGENCY MEDICINE

## 2023-03-03 PROCEDURE — 85025 COMPLETE CBC W/AUTO DIFF WBC: CPT | Performed by: EMERGENCY MEDICINE

## 2023-03-03 PROCEDURE — 250N000009 HC RX 250: Performed by: EMERGENCY MEDICINE

## 2023-03-03 PROCEDURE — 96374 THER/PROPH/DIAG INJ IV PUSH: CPT

## 2023-03-03 PROCEDURE — 250N000013 HC RX MED GY IP 250 OP 250 PS 637: Performed by: EMERGENCY MEDICINE

## 2023-03-03 PROCEDURE — 250N000011 HC RX IP 250 OP 636: Performed by: EMERGENCY MEDICINE

## 2023-03-03 PROCEDURE — 258N000003 HC RX IP 258 OP 636: Performed by: EMERGENCY MEDICINE

## 2023-03-03 PROCEDURE — 93005 ELECTROCARDIOGRAM TRACING: CPT | Performed by: EMERGENCY MEDICINE

## 2023-03-03 PROCEDURE — 36415 COLL VENOUS BLD VENIPUNCTURE: CPT | Performed by: EMERGENCY MEDICINE

## 2023-03-03 PROCEDURE — 83690 ASSAY OF LIPASE: CPT | Performed by: EMERGENCY MEDICINE

## 2023-03-03 PROCEDURE — 99285 EMERGENCY DEPT VISIT HI MDM: CPT | Mod: 25

## 2023-03-03 PROCEDURE — 71045 X-RAY EXAM CHEST 1 VIEW: CPT

## 2023-03-03 PROCEDURE — 96361 HYDRATE IV INFUSION ADD-ON: CPT

## 2023-03-03 RX ORDER — OMEPRAZOLE 40 MG/1
40 CAPSULE, DELAYED RELEASE ORAL DAILY
Qty: 14 CAPSULE | Refills: 0 | Status: SHIPPED | OUTPATIENT
Start: 2023-03-03 | End: 2023-03-17

## 2023-03-03 RX ORDER — SUCRALFATE 1 G/1
1 TABLET ORAL ONCE
Status: COMPLETED | OUTPATIENT
Start: 2023-03-03 | End: 2023-03-03

## 2023-03-03 RX ORDER — FAMOTIDINE 20 MG/1
20 TABLET, FILM COATED ORAL ONCE
Status: COMPLETED | OUTPATIENT
Start: 2023-03-03 | End: 2023-03-03

## 2023-03-03 RX ORDER — ONDANSETRON 2 MG/ML
4 INJECTION INTRAMUSCULAR; INTRAVENOUS ONCE
Status: COMPLETED | OUTPATIENT
Start: 2023-03-03 | End: 2023-03-03

## 2023-03-03 RX ORDER — SUCRALFATE 1 G/1
1 TABLET ORAL 3 TIMES DAILY
Qty: 90 TABLET | Refills: 0 | Status: SHIPPED | OUTPATIENT
Start: 2023-03-03 | End: 2023-04-02

## 2023-03-03 RX ORDER — ONDANSETRON 8 MG/1
8 TABLET, ORALLY DISINTEGRATING ORAL EVERY 8 HOURS PRN
Qty: 12 TABLET | Refills: 0 | Status: SHIPPED | OUTPATIENT
Start: 2023-03-03

## 2023-03-03 RX ORDER — FAMOTIDINE 20 MG/1
20 TABLET, FILM COATED ORAL 2 TIMES DAILY
Qty: 14 TABLET | Refills: 0 | Status: SHIPPED | OUTPATIENT
Start: 2023-03-03 | End: 2023-03-10

## 2023-03-03 RX ADMIN — ONDANSETRON 4 MG: 2 INJECTION INTRAMUSCULAR; INTRAVENOUS at 03:13

## 2023-03-03 RX ADMIN — PANTOPRAZOLE SODIUM 40 MG: 40 INJECTION, POWDER, FOR SOLUTION INTRAVENOUS at 03:13

## 2023-03-03 RX ADMIN — SODIUM CHLORIDE 500 ML: 9 INJECTION, SOLUTION INTRAVENOUS at 03:13

## 2023-03-03 RX ADMIN — SUCRALFATE 1 G: 1 TABLET ORAL at 03:13

## 2023-03-03 RX ADMIN — ALUMINUM HYDROXIDE, MAGNESIUM HYDROXIDE, AND DIMETHICONE 30 ML: 200; 20; 200 SUSPENSION ORAL at 03:12

## 2023-03-03 RX ADMIN — FAMOTIDINE 20 MG: 20 TABLET, FILM COATED ORAL at 03:13

## 2023-03-03 ASSESSMENT — ENCOUNTER SYMPTOMS
NAUSEA: 1
SHORTNESS OF BREATH: 1
VOMITING: 1

## 2023-03-03 ASSESSMENT — ACTIVITIES OF DAILY LIVING (ADL): ADLS_ACUITY_SCORE: 35

## 2023-03-03 NOTE — ED TRIAGE NOTES
Heartburn, sharpness of pain, and burning pain that feels like radiates superiorly into throat. States feels like can taste it. Pain radiates to back. Nauseated with multiple episodes of nausea this evening. Tried backing soda, malox without relief. States not producing noticible gas. Not belching.

## 2023-03-03 NOTE — ED PROVIDER NOTES
EMERGENCY DEPARTMENT ENCOUNTER      NAME: Rosalba Mcdonald  AGE: 42 year old female  YOB: 1980  MRN: [id number]  EVALUATION DATE & TIME: No admission date for patient encounter.    PCP: No Ref-Primary, Physician    ED PROVIDER: Nataliia Mendez M.D.      Chief Complaint   Patient presents with     Heartburn         FINAL IMPRESSION:  1. Acute gastritis without hemorrhage, unspecified gastritis type    2. Gastroesophageal reflux disease with esophagitis without hemorrhage        MEDICAL DECISION MAKING:    Pertinent Labs & Imaging studies reviewed. (See chart for details)  ED Course as of 03/03/23 0555   Fri Mar 03, 2023   0253 Afebrile.  Vital signs here with significant hypertension, mild tachycardia could be secondary to discomfort.  Patient is coming in for evaluation of feeling of heartburn.  She states since 5:00 this afternoon when she was at her second job she is developed on and off symptoms of significant burning in her chest.  She gets an acidic brash taste in her mouth during these times.  Vaginal slight shortness of breath, she is nauseated as well.  Thrown up multiple times tonight.  She states in the past when she has had these episodes usually vomits and feels better but today has not had any improvement.  She did try to drink some baking soda as well as some Maalox but did not get any significant lasting relief.  She is not on any daily medication for GERD or heartburn.  Positive smoking history with active tobacco use.  Otherwise no significant medical history.  No history of hypertension, hyperlipidemia, early family history of cardiac disease.    Patient EKG here currently shows sinus rhythm at a rate of 90 with sinus arrhythmia.  There is no ST elevations or depressions.  Intervals are intact.  Normal axis.  QTc is 425, QRS 74, .  As compared with previous EKG from September 27, 2021 no significant change including sinus arrhythmia noted at that time as well.   0255 We will check  labs for patient here.  We will aggressively treat for possible heartburn causes with GI cocktail, Protonix, Carafate, Zofran.  We will also check cardiac enzymes.  EKG here is nonischemic.  Will evaluate again after medications, labs.   0334 Patient feels significantly improved after medications and now it is no longer having any symptoms whatsoever.  Blood pressure came down to the 120s, heart rate down to the 70s.  Labs here unremarkable.  Awaiting chest x-ray, if that does look normal will send home with information about gastritis/GERD and close follow-up with primary care.  Discussed results of lab results with patient.  She is in agreement with plan.       Chest x-ray here is unremarkable.  Patient discharged home, close follow-up with primary care.  Medical Decision Making    History:    Supplemental history from: Documented in chart, if applicable    External Record(s) reviewed: Documented in chart, if applicable.    Work Up:    Chart documentation includes differential considered and any EKGs or imaging independently interpreted by provider, where specified.    In additional to work up documented, I considered the following work up: Documented in chart, if applicable.    External consultation:    Discussion of management with another provider: Documented in chart, if applicable    Complicating factors:    Care impacted by chronic illness: Smoking / Nicotine Use    Care affected by social determinants of health: N/A    Disposition considerations: Discharge. I prescribed additional prescription strength medication(s) as charted. See documentation for any additional details.          Critical care: 0 minutes excluding separately billable procedures.  Includes bedside management, time reviewing test results, review of records, discussing the case with staff, documenting the medical record and time spent with family members (or surrogate decision makers) discussing specific treatment issues.          ED  COURSE:  2:48 AM I met with the patient, obtained history, performed an initial exam, and discussed options and plan for diagnostics and treatment here in the ED.  3:30 AM I rechecked the patient. Patient is feeling significantly improved after medications.     The importance of close follow up was discussed. We reviewed warning signs and symptoms, and I instructed Ms. Mcdonald to return to the emergency department immediately if she develops any new or worsening symptoms. I provided additional verbal discharge instructions. Ms. Mcdonald expressed understanding and agreement with this plan of care, her questions were answered, and she was discharged in stable condition.     MEDICATIONS GIVEN IN THE EMERGENCY:  Medications   lidocaine (viscous) (XYLOCAINE) 2 % 15 mL, alum & mag hydroxide-simethicone (MAALOX) 15 mL GI Cocktail (30 mLs Oral $Given 3/3/23 0312)   pantoprazole (PROTONIX) IV push injection 40 mg (40 mg Intravenous $Given 3/3/23 0313)   0.9% sodium chloride BOLUS (0 mLs Intravenous Stopped 3/3/23 0414)   sucralfate (CARAFATE) tablet 1 g (1 g Oral $Given 3/3/23 0313)   ondansetron (ZOFRAN) injection 4 mg (4 mg Intravenous $Given 3/3/23 0313)   famotidine (PEPCID) tablet 20 mg (20 mg Oral $Given 3/3/23 0313)       NEW PRESCRIPTIONS STARTED AT TODAY'S ER VISIT:  Discharge Medication List as of 3/3/2023  4:14 AM      START taking these medications    Details   famotidine (PEPCID) 20 MG tablet Take 1 tablet (20 mg) by mouth 2 times daily for 7 days, Disp-14 tablet, R-0, Local Print      omeprazole (PRILOSEC) 40 MG DR capsule Take 1 capsule (40 mg) by mouth daily for 14 days, Disp-14 capsule, R-0, Local Print      ondansetron (ZOFRAN ODT) 8 MG ODT tab Take 1 tablet (8 mg) by mouth every 8 hours as needed for nausea, Disp-12 tablet, R-0, Local Print      sucralfate (CARAFATE) 1 GM tablet Take 1 tablet (1 g) by mouth 3 times daily for 30 days, Disp-90 tablet, R-0, Local Print                 =================================================================    HPI    Patient information was obtained from: Patient     Use of : N/A         Rosalba Mcdonald is a 42 year old female who presents with heartburn sensation.     Patient is coming in for evaluation of feeling of heartburn. She states since 5:00 PM (~12 hours ago) this afternoon when she was at her second job, she developed on and off symptoms of significant burning in her chest. She gets an acidic brash taste in her mouth during these times. She also endorses slight shortness of breath and nausea. She has thrown up multiple times tonight. She states in the past when she has had these episodes usually vomits and feels better but today has not had any improvement. She did try to drink some baking soda as well as some Maalox but did not get any significant lasting relief. She is not on any daily medication for GERD or heartburn. Otherwise no significant medical history. No history of hypertension, hyperlipidemia, early family history of cardiac disease.    Per chart review, the patient has a history of heart disorder, kidney stones, GYPSY, and tobacco abuse.    Social Hx: Smoker.    REVIEW OF SYSTEMS   Review of Systems   HENT:        Positive for taste change.   Respiratory: Positive for shortness of breath.    Gastrointestinal: Positive for nausea and vomiting.        Positive for heartburn sensation.   All other systems reviewed and are negative.        PAST MEDICAL HISTORY:  History reviewed. No pertinent past medical history.    PAST SURGICAL HISTORY:  History reviewed. No pertinent surgical history.    CURRENT MEDICATIONS:    No current facility-administered medications for this encounter.    Current Outpatient Medications:      famotidine (PEPCID) 20 MG tablet, Take 1 tablet (20 mg) by mouth 2 times daily for 7 days, Disp: 14 tablet, Rfl: 0     omeprazole (PRILOSEC) 40 MG DR capsule, Take 1 capsule (40 mg) by mouth daily for 14 days,  Disp: 14 capsule, Rfl: 0     ondansetron (ZOFRAN ODT) 8 MG ODT tab, Take 1 tablet (8 mg) by mouth every 8 hours as needed for nausea, Disp: 12 tablet, Rfl: 0     sucralfate (CARAFATE) 1 GM tablet, Take 1 tablet (1 g) by mouth 3 times daily for 30 days, Disp: 90 tablet, Rfl: 0     cyclobenzaprine (FLEXERIL) 10 MG tablet, Take 1 tablet by mouth 3 times daily as needed for muscle spasms., Disp: 21 tablet, Rfl: 1    ALLERGIES:  Allergies   Allergen Reactions     Penicillins Other (See Comments)     Yeast infections       FAMILY HISTORY:  History reviewed. No pertinent family history.    SOCIAL HISTORY:   Social History     Socioeconomic History     Marital status: Single   Tobacco Use     Smoking status: Every Day     Tobacco comments:     5 cigarettes per day       PHYSICAL EXAM:    Vitals: /83   Pulse 80   Temp 98.2  F (36.8  C) (Oral)   Resp 20   Wt 114.8 kg (253 lb)   SpO2 98%   BMI 35.29 kg/m     General:. Alert and interactive, mildly uncomfortable appearing.   HENT: Oropharynx without erythema or exudates. MMM.  TMs clear bilaterally.  Eyes: Pupils mid-sized and equally reactive.   Neck: Full AROM.  No midline tenderness to palpation.  Cardiovascular: Regular rate and rhythm. Peripheral pulses 2+ bilaterally.  Chest/Pulmonary: Normal work of breathing. Lung sounds clear and equal throughout, no wheezes or crackles. No chest wall tenderness or deformities.  Abdomen: Soft, nondistended. Mild epigastric tenderness to palpation without guarding or rebound.  Back/Spine: No CVA or midline tenderness.  Extremities: Normal ROM of all major joints. No lower extremity edema.   Skin: Warm and dry. Normal skin color.   Neuro: Speech clear. CNs grossly intact. Moves all extremities appropriately. Strength and sensation grossly intact to all extremities.   Psych: Normal affect/mood, cooperative, memory appropriate.     LAB:  All pertinent labs reviewed and interpreted.  Labs Ordered and Resulted from Time of ED  Arrival to Time of ED Departure   COMPREHENSIVE METABOLIC PANEL - Normal       Result Value    Sodium 141      Potassium 3.6      Chloride 105      Carbon Dioxide (CO2) 27      Anion Gap 9      Urea Nitrogen 11      Creatinine 0.80      Calcium 9.6      Glucose 101      Alkaline Phosphatase 119      AST 19      ALT 22      Protein Total 7.0      Albumin 4.0      Bilirubin Total 0.5      GFR Estimate >90     LIPASE - Normal    Lipase 28     TROPONIN I - Normal    Troponin I 0.01     CBC WITH PLATELETS AND DIFFERENTIAL    WBC Count 10.5      RBC Count 4.27      Hemoglobin 13.7      Hematocrit 39.2      MCV 92      MCH 32.1      MCHC 34.9      RDW 12.6      Platelet Count 281      % Neutrophils 44      % Lymphocytes 38      % Monocytes 10      % Eosinophils 7      % Basophils 1      % Immature Granulocytes 0      NRBCs per 100 WBC 0      Absolute Neutrophils 4.7      Absolute Lymphocytes 4.0      Absolute Monocytes 1.0      Absolute Eosinophils 0.7      Absolute Basophils 0.1      Absolute Immature Granulocytes 0.0      Absolute NRBCs 0.0         RADIOLOGY:  XR Chest Port 1 View   Final Result   IMPRESSION: Negative chest.          EKG:  See MDM  I have independently reviewed and interpreted the EKG(s) documented above.           I, Ángel Long, am serving as a scribe to document services personally performed by Dr. Nataliia Mendez  based on my observation and the provider's statements to me. I, Nataliia Mendez MD attest that Ángel Long is acting in a scribe capacity, has observed my performance of the services and has documented them in accordance with my direction.      Nataliia Mendez M.D.  Emergency Medicine  [facility] EMERGENCY ROOM  [address] [id number] [phone]  Dept: [phone]     Nataliia Mendez MD  03/03/23 0556

## 2023-03-03 NOTE — Clinical Note
Rosalba Mcdonald was seen and treated in our emergency department on 3/3/2023.  She may return to work on 03/04/2023.  ?     If you have any questions or concerns, please don't hesitate to call.      Magalis Ball RN

## 2023-03-03 NOTE — Clinical Note
Rosalba Mcdonald was seen and treated in our emergency department on 3/3/2023.    Okay to return to work 3/4/23     Sincerely,     [facility] Emergency Room

## 2024-06-11 ENCOUNTER — APPOINTMENT (OUTPATIENT)
Dept: ULTRASOUND IMAGING | Facility: CLINIC | Age: 44
End: 2024-06-11
Attending: EMERGENCY MEDICINE
Payer: COMMERCIAL

## 2024-06-11 ENCOUNTER — HOSPITAL ENCOUNTER (EMERGENCY)
Facility: CLINIC | Age: 44
Discharge: HOME | End: 2024-06-11
Admitting: EMERGENCY MEDICINE
Payer: COMMERCIAL

## 2024-06-11 VITALS
RESPIRATION RATE: 20 BRPM | WEIGHT: 256 LBS | HEART RATE: 100 BPM | HEIGHT: 71 IN | BODY MASS INDEX: 35.84 KG/M2 | TEMPERATURE: 97.9 F | OXYGEN SATURATION: 98 % | DIASTOLIC BLOOD PRESSURE: 94 MMHG | SYSTOLIC BLOOD PRESSURE: 157 MMHG

## 2024-06-11 DIAGNOSIS — M79.89 LEG SWELLING: ICD-10-CM

## 2024-06-11 LAB
ANION GAP SERPL CALCULATED.3IONS-SCNC: 8 MMOL/L (ref 7–15)
BUN SERPL-MCNC: 8 MG/DL (ref 6–20)
CALCIUM SERPL-MCNC: 8.2 MG/DL (ref 8.6–10)
CHLORIDE SERPL-SCNC: 107 MMOL/L (ref 98–107)
CREAT SERPL-MCNC: 0.76 MG/DL (ref 0.51–0.95)
DEPRECATED HCO3 PLAS-SCNC: 26 MMOL/L (ref 22–29)
EGFRCR SERPLBLD CKD-EPI 2021: >90 ML/MIN/1.73M2
ERYTHROCYTE [DISTWIDTH] IN BLOOD BY AUTOMATED COUNT: 13.1 % (ref 10–15)
GLUCOSE SERPL-MCNC: 150 MG/DL (ref 70–99)
HCT VFR BLD AUTO: 41.2 % (ref 35–47)
HGB BLD-MCNC: 13.9 G/DL (ref 11.7–15.7)
MCH RBC QN AUTO: 32.3 PG (ref 26.5–33)
MCHC RBC AUTO-ENTMCNC: 33.7 G/DL (ref 31.5–36.5)
MCV RBC AUTO: 96 FL (ref 78–100)
PLATELET # BLD AUTO: 298 10E3/UL (ref 150–450)
POTASSIUM SERPL-SCNC: 4.2 MMOL/L (ref 3.4–5.3)
RBC # BLD AUTO: 4.31 10E6/UL (ref 3.8–5.2)
SODIUM SERPL-SCNC: 141 MMOL/L (ref 135–145)
WBC # BLD AUTO: 8 10E3/UL (ref 4–11)

## 2024-06-11 PROCEDURE — 80048 BASIC METABOLIC PNL TOTAL CA: CPT | Performed by: EMERGENCY MEDICINE

## 2024-06-11 PROCEDURE — 85041 AUTOMATED RBC COUNT: CPT | Performed by: EMERGENCY MEDICINE

## 2024-06-11 PROCEDURE — 36415 COLL VENOUS BLD VENIPUNCTURE: CPT | Performed by: EMERGENCY MEDICINE

## 2024-06-11 PROCEDURE — 99284 EMERGENCY DEPT VISIT MOD MDM: CPT | Mod: 25

## 2024-06-11 PROCEDURE — 93971 EXTREMITY STUDY: CPT | Mod: RT

## 2024-06-11 ASSESSMENT — COLUMBIA-SUICIDE SEVERITY RATING SCALE - C-SSRS
2. HAVE YOU ACTUALLY HAD ANY THOUGHTS OF KILLING YOURSELF IN THE PAST MONTH?: NO
1. IN THE PAST MONTH, HAVE YOU WISHED YOU WERE DEAD OR WISHED YOU COULD GO TO SLEEP AND NOT WAKE UP?: NO
6. HAVE YOU EVER DONE ANYTHING, STARTED TO DO ANYTHING, OR PREPARED TO DO ANYTHING TO END YOUR LIFE?: NO

## 2024-06-11 NOTE — ED TRIAGE NOTES
Pt c/o R leg swelling from calf to groin starting last night. States she flew to a girl's trip over the weekend. No redness that pt has noticed. Endorses some SOB last night. No hx blood clots personally, but endorses family hx of clots.     Triage Assessment (Adult)       Row Name 06/11/24 5310          Triage Assessment    Airway WDL WDL        Respiratory WDL    Respiratory WDL X;rhythm/pattern     Rhythm/Pattern, Respiratory shortness of breath        Skin Circulation/Temperature WDL    Skin Circulation/Temperature WDL WDL        Cardiac WDL    Cardiac WDL WDL        Peripheral/Neurovascular WDL    Peripheral Neurovascular WDL WDL        Cognitive/Neuro/Behavioral WDL    Cognitive/Neuro/Behavioral WDL WDL

## 2024-06-11 NOTE — DISCHARGE INSTRUCTIONS
You are seen and evaluated here in the emergency department for your leg tightness and swelling.  At this time, labs are reassuring and no signs of DVT on ultrasound.  You had bilateral leg swelling prior to this and the left is improving greater than the right side and I do feel that the factors of your vacation, flights and heat contributed to the symptoms.    Recommend compression, elevation, plenty of fluids and close follow-up with primary care in 1 week if symptoms are not improving.    If you develop chest pain, shortness of breath, coughing up blood or any other concerns please return to the emergency department.

## 2024-06-11 NOTE — ED PROVIDER NOTES
EMERGENCY DEPARTMENT ENCOUNTER      NAME: Rosalba Mcdonald  AGE: 43 year old female  YOB: 1980  MRN: [id number]  EVALUATION DATE & TIME: 6/11/2024  3:13 PM    PCP: Qasim Reyes [facility]    ED PROVIDER: Estelel Rodriguez PA-C      Chief Complaint   Patient presents with    Leg Swelling         FINAL IMPRESSION:  1. Leg swelling          MEDICAL DECISION MAKING:    Pertinent Labs & Imaging studies reviewed. (See chart for details)  Rosalba Mcdonald is a 43 year old female with a pertinent history of GYPSY who presents to this ED via private vehicle for evaluation of leg swelling and tightness.  The patient recently traveled down to [place] and and almost as soon as she landed within 2 hours she had bilateral lower leg swelling.  She was walking a lot but was resting and elevating but continued to have both legs swollen and when she got home things were worse.  Today, she noticed that the left leg was much less swollen but the right leg was still swollen and tight and was tingling when she woke up.  She has a family history of blood clots and was concerned that this may be causing her symptoms and presented to the emergency department.  On my evaluation, patient was slightly hypertensive at 157/94 but otherwise vitally stable.  Examination with bilateral lower extremity swelling, right greater than left.  2+ DP pulses.  No tenderness to palpation of the ankle, knee or hip joints.  Normal range of motion of the right lower extremity.  Heart regular rate and rhythm lungs to auscultation bilaterally.    Ultrasound was ordered in triage prior to my evaluation as well as CBC and BMP.  CBC and BMP without significant derangements.  Ultrasound was negative for DVT.  Patient denies current chest pain, shortness of breath and was reassured by negative ultrasound of the right lower extremity.  I discussed that we could work her up for possible PE as she did have some symptoms yesterday of the shortness of  breath however, patient would not like to proceed with this as she does attribute this to her anxiety and symptoms of the leg swelling are improving and she just wanted to make sure that this was not a blood clot.  I do feel that this is reasonable and we discussed elevation, compression and close follow-up with primary care if symptoms or not improving.  Patient had recent travel, has a history of leg swelling, both legs were swollen and it was very hot and I do feel that her's symptoms of leg swelling are likely due to the travel, heat and also just being on vacation and probably increased salt intake.  I do feel she is appropriate for discharge home with outpatient follow-up as needed.  We discussed reasons to return to the emergency department and all questions were answered the best my ability.  She was discharged home in stable condition.    Medical Decision Making  Obtained supplemental history:Supplemental history obtained?: No  Reviewed external records: External records reviewed?: No  Care impacted by chronic illness:Mental Health  Care significantly affected by social determinants of health:Access to Medical Care  Did you consider but not order tests?: Work up considered but not performed and documented in chart, if applicable  Did you interpret images independently?: Independent interpretation of ECG and images noted in documentation, when applicable.  Consultation discussion with other provider:Did you involve another provider (consultant, MH, pharmacy, etc.)?: No  Discharge. No recommendations on prescription strength medication(s). See documentation for any additional details.     ED COURSE:  3:00 PM I met with the patient, obtained history, performed an initial exam, and discussed options and plan for diagnostics and treatment here in the ED. Patient discharged after being provided with extensive anticipatory guidance and given return precautions, importance of PCP follow-up emphasized.    At the  conclusion of the encounter I discussed the results of all of the tests and the disposition. The questions were answered. The patient or family acknowledged understanding and was agreeable with the care plan.     MEDICATIONS GIVEN IN THE EMERGENCY:  Medications - No data to display    NEW PRESCRIPTIONS STARTED AT TODAY'S ER VISIT  Discharge Medication List as of 6/11/2024  3:14 PM               =================================================================    HPI:    Patient information was obtained from: The patient    Use of Interpretor: N/A          Rosalba Mcdonald is a 43 year old female with a pertinent history of GYPSY who presents to this ED via private vehicle for evaluation of leg swelling and tightness.  The patient recently traveled down to [place] and and almost as soon as she landed within 2 hours she had bilateral lower leg swelling.  She was walking a lot but was resting and elevating but continued to have both legs swollen and when she got home things were worse.  Today, she noticed that the left leg was much less swollen but the right leg was still swollen and tight and was tingling when she woke up.  She has a family history of blood clots and was concerned that this may be causing her symptoms and presented to the emergency department.  On my evaluation, patient denies any significant pain in the leg.  No falls or injuries.  Able to move and walk on the leg without significant difficulty.  She has not had any chest pain but did note some slight shortness of breath yesterday but she said she was having some anxiety about her symptoms and attributes it to this.  No chest pain or shortness of breath today.  She is not coughing up any blood.  No other concerns voiced at this time.      REVIEW OF SYSTEMS:  Negative unless otherwise stated in the above HPI.       PAST MEDICAL HISTORY:  No past medical history on file.    PAST SURGICAL HISTORY:  No past surgical history on file.        CURRENT  "MEDICATIONS:    No current facility-administered medications for this encounter.    Current Outpatient Medications:     cyclobenzaprine (FLEXERIL) 10 MG tablet, Take 1 tablet by mouth 3 times daily as needed for muscle spasms., Disp: 21 tablet, Rfl: 1    ondansetron (ZOFRAN ODT) 8 MG ODT tab, Take 1 tablet (8 mg) by mouth every 8 hours as needed for nausea, Disp: 12 tablet, Rfl: 0      ALLERGIES:  Allergies   Allergen Reactions    Penicillins Other (See Comments)     Yeast infections       FAMILY HISTORY:  No family history on file.    SOCIAL HISTORY:   Social History     Socioeconomic History    Marital status: Single   Tobacco Use    Smoking status: Every Day    Tobacco comments:     5 cigarettes per day       VITALS:  Patient Vitals for the past 24 hrs:   BP Temp Temp src Pulse Resp SpO2 Height Weight   06/11/24 1334 (!) 157/94 97.9  F (36.6  C) Oral 100 20 98 % 1.803 m (5' 11\") 116.1 kg (256 lb)       PHYSICAL EXAM    Constitutional: Well developed, Well nourished, NAD  HENT: Normocephalic, Atraumatic, Bilateral external ears normal, Oropharynx normal, mucous membranes moist, Nose normal.   Neck: Normal range of motion, No tenderness, Supple, No stridor.  Eyes: Eyes track normally throughout exam, Conjunctiva normal, No discharge.   Respiratory: Normal breath sounds, No respiratory distress, No wheezing, Speaks full sentences easily. No cough.  Cardiovascular: Normal heart rate, Regular rhythm, No murmurs, No rubs, No gallops. Chest wall nontender.  Musculoskeletal: 2+ DP pulses. Bilateral lower extremity swelling, right greater than left. No cyanosis, No clubbing. Good range of motion in all major joints. No tenderness to palpation or major deformities noted.    Integument: Warm, Dry, No erythema, No rash. No petechiae.  Neurologic: Alert & oriented x 3, Normal motor function, Normal sensory function, No focal deficits noted. Normal gait.  Psychiatric: Affect normal, Judgment normal, Mood normal. " Cooperative.    LAB:  All pertinent labs reviewed and interpreted.  Recent Results (from the past 24 hour(s))   CBC (+ platelets, no diff)    Collection Time: 06/11/24  2:09 PM   Result Value Ref Range    WBC Count 8.0 4.0 - 11.0 10e3/uL    RBC Count 4.31 3.80 - 5.20 10e6/uL    Hemoglobin 13.9 11.7 - 15.7 g/dL    Hematocrit 41.2 35.0 - 47.0 %    MCV 96 78 - 100 fL    MCH 32.3 26.5 - 33.0 pg    MCHC 33.7 31.5 - 36.5 g/dL    RDW 13.1 10.0 - 15.0 %    Platelet Count 298 150 - 450 10e3/uL   Basic metabolic panel    Collection Time: 06/11/24  2:09 PM   Result Value Ref Range    Sodium 141 135 - 145 mmol/L    Potassium 4.2 3.4 - 5.3 mmol/L    Chloride 107 98 - 107 mmol/L    Carbon Dioxide (CO2) 26 22 - 29 mmol/L    Anion Gap 8 7 - 15 mmol/L    Urea Nitrogen 8.0 6.0 - 20.0 mg/dL    Creatinine 0.76 0.51 - 0.95 mg/dL    GFR Estimate >90 >60 mL/min/1.73m2    Calcium 8.2 (L) 8.6 - 10.0 mg/dL    Glucose 150 (H) 70 - 99 mg/dL         RADIOLOGY:  Reviewed all pertinent imaging. Please see official radiology report.  US Lower Extremity Venous Duplex Right   Final Result   IMPRESSION:   1.  No deep venous thrombosis in the right lower extremity.          PROCEDURES:   None.     Estelle Rodriguez PA-C  Emergency Medicine  [facility]  6/11/2024      Estelle Rodriguez PA-C  06/11/24 1525